# Patient Record
Sex: FEMALE | Race: WHITE | NOT HISPANIC OR LATINO | Employment: FULL TIME | URBAN - METROPOLITAN AREA
[De-identification: names, ages, dates, MRNs, and addresses within clinical notes are randomized per-mention and may not be internally consistent; named-entity substitution may affect disease eponyms.]

---

## 2017-03-24 ENCOUNTER — INITIAL CONSULT (OUTPATIENT)
Dept: PSYCHIATRY | Facility: CLINIC | Age: 37
End: 2017-03-24
Payer: COMMERCIAL

## 2017-03-24 DIAGNOSIS — F33.1 MDD (MAJOR DEPRESSIVE DISORDER), RECURRENT EPISODE, MODERATE: Primary | ICD-10-CM

## 2017-03-24 PROCEDURE — 90792 PSYCH DIAG EVAL W/MED SRVCS: CPT | Mod: S$GLB,,, | Performed by: PSYCHIATRY & NEUROLOGY

## 2017-03-24 RX ORDER — HYDROCODONE BITARTRATE AND ACETAMINOPHEN 5; 325 MG/1; MG/1
1 TABLET ORAL EVERY 6 HOURS PRN
COMMUNITY
End: 2022-10-04 | Stop reason: ALTCHOICE

## 2017-03-24 RX ORDER — BUPROPION HYDROCHLORIDE 150 MG/1
TABLET ORAL
Qty: 60 TABLET | Refills: 1 | Status: SHIPPED | OUTPATIENT
Start: 2017-03-24 | End: 2022-10-04 | Stop reason: ALTCHOICE

## 2017-03-27 NOTE — PROGRESS NOTES
"Outpatient Psychiatry Initial Visit (MD/NP)    3/24/2017    Yaneth Madrid, a 36 y.o. female, presenting for initial evaluation visit. Met with patient.    Reason for Encounter: self-referral. Patient complains of depression.    History of Present Illness: This 35 y/o F with past history of depression, presents for evaluation & treatment, endorses low moods most of the time, decreased motivation, diminished pleasure in previously enjoyed activities, pessimistic outlook about the future, increased appetite & weight gain, with symptoms present persistently in episodic fashion, current episode about 2 years.  Functioning decreased. Denies SI, AVH, delusions, self-injurious behaviors.     PastPsychHistory: First evaluation at age 17 for depression. Has also experienced anxiety featuring worry, tension, apprehension, particularly in & in anticipation of work. Has taken fluoxetine (poorly tolerated), xanax in past. No hx of psychiatric hospitalizations, psychosis, robin.   MedHx: ruptured cerv. Disc & chronic pain. Takes flexeril, rarely norco  FamHx: Pat. GF & Mat. GM with alcohol use disorder; describes father as "functional alcoholic"  Subst. Hx: infrequent social alcohol, no illicits, no prescription misuse  SocHx:  x 2, current marriage troubled by 's alcohol abuse. 3 kids (14, 12, 10) from her previous marriage, kids healthy & well. Family generally supportive. Some college. Successful in work life.    Review Of Systems:     GENERAL:  No weight gain or loss  SKIN:  No rashes or lacerations  HEAD:  No headaches  EYES:  No exophthalmos, jaundice or blindness  EARS:  No dizziness, tinnitus or hearing loss  NOSE:  No changes in smell  MOUTH & THROAT:  No dyskinetic movements or obvious goiter  CHEST:  No shortness of breath, hyperventilation or cough  CARDIOVASCULAR:  No tachycardia or chest pain  ABDOMEN:  No nausea, vomiting, pain, constipation or diarrhea  URINARY:  No frequency, dysuria or sexual " dysfunction  ENDOCRINE:  No polydipsia, polyuria  MUSCULOSKELETAL:  No pain or stiffness of the joints  NEUROLOGIC:  No weakness, sensory changes, seizures, confusion, memory loss, tremor or other abnormal movements    Current Evaluation:     Nutritional Screening: Considering the patient's height and weight, medications, medical history and preferences, should a referral be made to the dietitian? no    Constitutional  Vitals:  Most recent vital signs, dated less than 90 days prior to this appointment, were not reviewed.    There were no vitals filed for this visit.     General:  unremarkable, age appropriate     Musculoskeletal  Muscle Strength/Tone:  no tremor, no tic   Gait & Station:  non-ataxic     Psychiatric  Appearance: casually dressed & groomed;   Behavior: calm,   Cooperation: cooperative with assessment  Speech: normal rate, volume, tone  Thought Process: linear, goal-directed  Thought Content: No suicidal or homicidal ideation; no delusions  Affect: depressed  Mood: depressed  Perceptions: No auditory or visual hallucinations  Level of Consciousness: alert throughout interview  Insight: fair  Cognition: Oriented to person, place, time, & situation  Memory: no apparent deficits to general clinical interview; not formally assessed  Attention/Concentration: no apparent deficits to general clinical interview; not formally assessed  Fund of Knowledge: average by vocabulary/education    Laboratory Data  No results found for any previous visit.      Medications  Outpatient Encounter Prescriptions as of 3/24/2017   Medication Sig Dispense Refill    hydrocodone-acetaminophen 5-325mg (NORCO) 5-325 mg per tablet Take 1 tablet by mouth every 6 (six) hours as needed.      buPROPion (WELLBUTRIN XL) 150 MG TB24 tablet Take 1 tablet daily for 3 days then 2 tablets daily thereafter 60 tablet 1     No facility-administered encounter medications on file as of 3/24/2017.        Assessment - Diagnosis - Goals:      Impression: This 37 y/o WF with recurrent major depression, chronic symptoms. Primary stressor is relationship troubles. Would benefit from trial of medication, psychotherapy referral.     MDD, recurrent, moderate    Treatment Goals:  Specify outcomes written in observable, behavioral terms:   Improve moods by self-report questionnaire    Treatment Plan/Recommendations:   · Medication Management: The risks and benefits of medication were discussed with the patient. bupropion xl - taper up to 300 mg daily.  · The treatment plan and follow up plan were reviewed with the patient.      Return to Clinic: 6 weeks    Counseling time: 10 minutes  Total time: 50 minutes    FRANCISCO Olivarez MD  Psychiatry  [facility]  [address] , [place], [place] [id number] [phone]

## 2021-01-04 ENCOUNTER — OFFICE VISIT (OUTPATIENT)
Dept: URGENT CARE | Facility: CLINIC | Age: 41
End: 2021-01-04
Payer: COMMERCIAL

## 2021-01-04 VITALS
HEART RATE: 106 BPM | TEMPERATURE: 97 F | OXYGEN SATURATION: 99 % | WEIGHT: 213.5 LBS | SYSTOLIC BLOOD PRESSURE: 146 MMHG | DIASTOLIC BLOOD PRESSURE: 86 MMHG

## 2021-01-04 DIAGNOSIS — R07.89 FEELING OF CHEST TIGHTNESS: Primary | ICD-10-CM

## 2021-01-04 DIAGNOSIS — R11.2 NAUSEA AND VOMITING IN ADULT: ICD-10-CM

## 2021-01-04 DIAGNOSIS — Z20.822 SUSPECTED COVID-19 VIRUS INFECTION: ICD-10-CM

## 2021-01-04 DIAGNOSIS — R42 DIZZINESS: ICD-10-CM

## 2021-01-04 DIAGNOSIS — R68.89 FLU-LIKE SYMPTOMS: ICD-10-CM

## 2021-01-04 DIAGNOSIS — R00.2 HEART PALPITATIONS: ICD-10-CM

## 2021-01-04 LAB
CTP QC/QA: YES
CTP QC/QA: YES
POC MOLECULAR INFLUENZA A AGN: NEGATIVE
POC MOLECULAR INFLUENZA B AGN: NEGATIVE
SARS-COV-2 RDRP RESP QL NAA+PROBE: NEGATIVE

## 2021-01-04 PROCEDURE — 87502 POCT INFLUENZA A/B MOLECULAR: ICD-10-PCS | Mod: QW,S$GLB,, | Performed by: NURSE PRACTITIONER

## 2021-01-04 PROCEDURE — 93010 EKG 12-LEAD: ICD-10-PCS | Mod: S$GLB,,, | Performed by: INTERNAL MEDICINE

## 2021-01-04 PROCEDURE — 1126F AMNT PAIN NOTED NONE PRSNT: CPT | Mod: S$GLB,,, | Performed by: NURSE PRACTITIONER

## 2021-01-04 PROCEDURE — 93005 ELECTROCARDIOGRAM TRACING: CPT | Mod: S$GLB,,, | Performed by: NURSE PRACTITIONER

## 2021-01-04 PROCEDURE — 99214 PR OFFICE/OUTPT VISIT, EST, LEVL IV, 30-39 MIN: ICD-10-PCS | Mod: S$GLB,,, | Performed by: NURSE PRACTITIONER

## 2021-01-04 PROCEDURE — 93010 ELECTROCARDIOGRAM REPORT: CPT | Mod: S$GLB,,, | Performed by: INTERNAL MEDICINE

## 2021-01-04 PROCEDURE — U0002 COVID-19 LAB TEST NON-CDC: HCPCS | Mod: QW,S$GLB,, | Performed by: NURSE PRACTITIONER

## 2021-01-04 PROCEDURE — 93005 EKG 12-LEAD: ICD-10-PCS | Mod: S$GLB,,, | Performed by: NURSE PRACTITIONER

## 2021-01-04 PROCEDURE — 87502 INFLUENZA DNA AMP PROBE: CPT | Mod: QW,S$GLB,, | Performed by: NURSE PRACTITIONER

## 2021-01-04 PROCEDURE — 99999 PR PBB SHADOW E&M-NEW PATIENT-LVL III: ICD-10-PCS | Mod: PBBFAC,,, | Performed by: NURSE PRACTITIONER

## 2021-01-04 PROCEDURE — 99999 PR PBB SHADOW E&M-NEW PATIENT-LVL III: CPT | Mod: PBBFAC,,, | Performed by: NURSE PRACTITIONER

## 2021-01-04 PROCEDURE — 99214 OFFICE O/P EST MOD 30 MIN: CPT | Mod: S$GLB,,, | Performed by: NURSE PRACTITIONER

## 2021-01-04 PROCEDURE — U0002: ICD-10-PCS | Mod: QW,S$GLB,, | Performed by: NURSE PRACTITIONER

## 2021-01-04 PROCEDURE — 1126F PR PAIN SEVERITY QUANTIFIED, NO PAIN PRESENT: ICD-10-PCS | Mod: S$GLB,,, | Performed by: NURSE PRACTITIONER

## 2021-01-04 RX ORDER — ONDANSETRON 4 MG/1
4 TABLET, ORALLY DISINTEGRATING ORAL EVERY 6 HOURS PRN
Qty: 10 TABLET | Refills: 0 | Status: SHIPPED | OUTPATIENT
Start: 2021-01-04 | End: 2022-08-16 | Stop reason: ALTCHOICE

## 2021-01-04 RX ORDER — DULAGLUTIDE 0.75 MG/.5ML
0.75 INJECTION, SOLUTION SUBCUTANEOUS
COMMUNITY
End: 2022-08-16 | Stop reason: ALTCHOICE

## 2022-08-16 PROBLEM — N92.0 MENORRHAGIA WITH REGULAR CYCLE: Status: ACTIVE | Noted: 2022-08-16

## 2025-03-10 ENCOUNTER — OFFICE VISIT (OUTPATIENT)
Dept: ORTHOPEDICS | Facility: CLINIC | Age: 45
End: 2025-03-10
Payer: COMMERCIAL

## 2025-03-10 ENCOUNTER — HOSPITAL ENCOUNTER (OUTPATIENT)
Dept: RADIOLOGY | Facility: HOSPITAL | Age: 45
Discharge: HOME | End: 2025-03-10
Attending: STUDENT IN AN ORGANIZED HEALTH CARE EDUCATION/TRAINING PROGRAM
Payer: COMMERCIAL

## 2025-03-10 VITALS — BODY MASS INDEX: 24.75 KG/M2 | HEIGHT: 66 IN | WEIGHT: 154 LBS

## 2025-03-10 DIAGNOSIS — S69.91XA INJURY OF RIGHT THUMB, INITIAL ENCOUNTER: Primary | ICD-10-CM

## 2025-03-10 DIAGNOSIS — M79.644 FINGER PAIN, RIGHT: Primary | ICD-10-CM

## 2025-03-10 DIAGNOSIS — M79.644 FINGER PAIN, RIGHT: ICD-10-CM

## 2025-03-10 PROCEDURE — 73140 X-RAY EXAM OF FINGER(S): CPT | Mod: 26,RT,, | Performed by: RADIOLOGY

## 2025-03-10 PROCEDURE — 97760 ORTHOTIC MGMT&TRAING 1ST ENC: CPT | Mod: S$GLB,,, | Performed by: STUDENT IN AN ORGANIZED HEALTH CARE EDUCATION/TRAINING PROGRAM

## 2025-03-10 PROCEDURE — 99204 OFFICE O/P NEW MOD 45 MIN: CPT | Mod: S$GLB,,, | Performed by: STUDENT IN AN ORGANIZED HEALTH CARE EDUCATION/TRAINING PROGRAM

## 2025-03-10 PROCEDURE — 1159F MED LIST DOCD IN RCRD: CPT | Mod: CPTII,S$GLB,, | Performed by: STUDENT IN AN ORGANIZED HEALTH CARE EDUCATION/TRAINING PROGRAM

## 2025-03-10 PROCEDURE — 73140 X-RAY EXAM OF FINGER(S): CPT | Mod: TC,RT

## 2025-03-10 PROCEDURE — 99999 PR PBB SHADOW E&M-EST. PATIENT-LVL III: CPT | Mod: PBBFAC,,, | Performed by: STUDENT IN AN ORGANIZED HEALTH CARE EDUCATION/TRAINING PROGRAM

## 2025-03-10 PROCEDURE — 1160F RVW MEDS BY RX/DR IN RCRD: CPT | Mod: CPTII,S$GLB,, | Performed by: STUDENT IN AN ORGANIZED HEALTH CARE EDUCATION/TRAINING PROGRAM

## 2025-03-10 PROCEDURE — 3008F BODY MASS INDEX DOCD: CPT | Mod: CPTII,S$GLB,, | Performed by: STUDENT IN AN ORGANIZED HEALTH CARE EDUCATION/TRAINING PROGRAM

## 2025-03-10 RX ORDER — OMEPRAZOLE 20 MG/1
1 CAPSULE, DELAYED RELEASE ORAL EVERY MORNING
COMMUNITY
Start: 2024-03-26

## 2025-03-10 RX ORDER — DICLOFENAC SODIUM 10 MG/G
2 GEL TOPICAL 3 TIMES DAILY PRN
Qty: 100 G | Refills: 2 | Status: SHIPPED | OUTPATIENT
Start: 2025-03-10

## 2025-03-10 NOTE — PROGRESS NOTES
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  Two weeks and 2 days ago, on 25, she was carrying several items when her thumb hyperextended.  Since that time, she has noted loss of motion in significant pain.  She describes the pain as shooting in nature.  Pain level is an 8/10.  She experiences throbbing and burning.  She is unable to take anti-inflammatories due to history of weight loss surgery.   She has been taking Tylenol.  No numbness or tingling.  No catching or locking symptoms.    Review of Systems  Review of systems negative for chest pain, shortness of breath, fevers, chills, nausea/vomiting.    Past Medical History  Past Medical History:   Diagnosis Date    Hypothyroidism, unspecified     Metabolic syndrome        Past Surgical History  Past Surgical History:   Procedure Laterality Date    ABDOMINOPLASTY      BIOPSY OF TONSILS       SECTION      HYSTEROSCOPY WITH DILATION AND CURETTAGE OF UTERUS  10/07/2022    Mirena IUD insertion    INCISION AND DRAINAGE DEEP NECK ABSCESS      INTRAUTERINE DEVICE INSERTION  10/07/2022    Mirena    NASAL SEPTUM SURGERY      TUBAL LIGATION         Allergies  Review of patient's allergies indicates:  No Known Allergies    Family History  Family History   Problem Relation Name Age of Onset    Heart attack Maternal Grandfather      Melanoma Mother         Social History  Social History[1]    Vital Signs  There were no vitals filed for this visit.    Physical Exam  Constitutional: Appears well-developed and well-nourished. No distress.   HENT:   Head: Normocephalic.   Eyes: EOM are normal.   Pulmonary/Chest: Effort normal.   Neurological: Oriented to person, place, and time.   Psychiatric: Normal mood and affect.       Right Upper Extremity:   No abrasions,  lacerations, wounds.  Minimal generalized swelling about the thumb.  Compartments are soft and compressible throughout the hand.  No ecchymosis.  No erythema.  No drainage.  Patient is able to demonstrate active flexion and extension at the thumb IP joint, 0-30 degrees.  Negative thumb adduction and extension test.  Patient has no tenderness over the thumb ulnar collateral ligament.  No pain or laxity with stress of the ulnar collateral ligament.  Patient has significant tenderness over the radial collateral ligament as well as significant pain with stress of the radial collateral ligament at 0 and 30°;   Possible mild laxity associated as well.  She has tenderness over the A1 pulley/sesamoid area.  No triggering with range of motion of the thumb.  No tenderness over the 1st dorsal extensor compartment.  Negative Finkelstein's.  Palpable radial pulse.  Sensation is intact in the median, radial, ulnar nerve distributions.    Imaging    Right thumb x-rays three views were obtained today and independently reviewed by myself.  There is no malalignment noted.  No arthritic changes are noted at the thumb CMC, MCP, or IP joints.  There is a possible small fracture at the proximal aspect of the radial sesamoid, difficult to assess due to overlying osseous structures.  No dislocation or subluxation.  No foreign body.    Assessment and Plan    44-year-old female presents for evaluation of a right thumb injury which occurred on 02/22/2025, 2 weeks and 2 days ago.  I had a long discussion with the patient about possible reasons for this.  She could have a radial collateral ligament of the thumb MCP joint.  Additionally this could be an injury to the sesamoid, it is difficult to tell if she could have a small fracture on today's x-rays.  I would like to obtain an MRI in order to further evaluate and have patient follow up after.  In the meantime, she can take Tylenol for pain.  I sent a script for Voltaren gel to patient's  pharmacy which she can apply to her thumb.    Patient was fitted for a thumb spica brace. At least 10 minutes were spent sizing, fitting, and educating for durable medical equipment application today.  This service was performed under the direction of Hanna Arriaga MD.  CPT 64081.   Discussed that she should continue to wear the brace until she obtains the MRI. MRI was ordered.  Follow up in clinic after MRI has been obtained to review the results and discuss next steps.    Hanna Arriaga MD  Orthopaedic Hand Surgery         [1]   Social History  Socioeconomic History    Marital status:    Tobacco Use    Smoking status: Never     Passive exposure: Never    Smokeless tobacco: Never   Substance and Sexual Activity    Alcohol use: Yes    Drug use: Never    Sexual activity: Yes     Partners: Male     Birth control/protection: Other-see comments     Comment: Tubal Ligation   Social History Narrative    ** Merged History Encounter **          Social Drivers of Health     Financial Resource Strain: Low Risk  (8/14/2023)    Received from [facility] and Its Subsidiaries and Affiliates    Overall Financial Resource Strain (CARDIA)     Difficulty of Paying Living Expenses: Not hard at all   Food Insecurity: No Food Insecurity (8/14/2023)    Received from [facility] and Its Subsidiaries and Affiliates    Hunger Vital Sign     Worried About Running Out of Food in the Last Year: Never true     Ran Out of Food in the Last Year: Never true   Transportation Needs: No Transportation Needs (8/14/2023)    Received from [facility] and Its Subsidiaries and Affiliates    PRAPARE - Transportation     Lack of Transportation (Medical): No     Lack of Transportation (Non-Medical): No   Physical Activity: Sufficiently Active (8/14/2023)    Received from [facility] and Its  Subsidiaries and Affiliates    Exercise Vital Sign     Days of Exercise per Week: 5 days     Minutes of Exercise per Session: 150+ min   Stress: No Stress Concern Present (8/14/2023)    Received from [facility] and Its Subsidiaries and Affiliates    [employer] - Occupational Stress Questionnaire     Feeling of Stress : Not at all   Housing Stability: Unknown (8/14/2023)    Received from [facility] and Its Subsidiaries and Affiliates    Housing Stability Vital Sign     Unable to Pay for Housing in the Last Year: No     Unstable Housing in the Last Year: No

## 2025-05-08 ENCOUNTER — PATIENT MESSAGE (OUTPATIENT)
Dept: RESEARCH | Facility: HOSPITAL | Age: 45
End: 2025-05-08

## 2025-07-14 ENCOUNTER — HOSPITAL ENCOUNTER (OUTPATIENT)
Dept: RADIOLOGY | Facility: HOSPITAL | Age: 45
Discharge: HOME | End: 2025-07-14
Attending: OBSTETRICS & GYNECOLOGY
Payer: COMMERCIAL

## 2025-07-14 VITALS — WEIGHT: 152.13 LBS | HEIGHT: 66 IN | BODY MASS INDEX: 24.45 KG/M2

## 2025-07-14 DIAGNOSIS — Z12.31 ENCOUNTER FOR SCREENING MAMMOGRAM FOR BREAST CANCER: ICD-10-CM

## 2025-07-14 PROCEDURE — 77063 BREAST TOMOSYNTHESIS BI: CPT | Mod: 26,,, | Performed by: RADIOLOGY

## 2025-07-14 PROCEDURE — 77067 SCR MAMMO BI INCL CAD: CPT | Mod: 26,,, | Performed by: RADIOLOGY

## 2025-07-14 PROCEDURE — 77063 BREAST TOMOSYNTHESIS BI: CPT | Mod: TC
